# Patient Record
Sex: MALE | Race: BLACK OR AFRICAN AMERICAN | NOT HISPANIC OR LATINO | Employment: UNEMPLOYED | URBAN - METROPOLITAN AREA
[De-identification: names, ages, dates, MRNs, and addresses within clinical notes are randomized per-mention and may not be internally consistent; named-entity substitution may affect disease eponyms.]

---

## 2022-09-23 ENCOUNTER — HOSPITAL ENCOUNTER (EMERGENCY)
Facility: CLINIC | Age: 22
Discharge: PSYCHIATRIC OR SUBSTANCE USE FACILITY | End: 2022-09-24
Attending: EMERGENCY MEDICINE | Admitting: EMERGENCY MEDICINE
Payer: COMMERCIAL

## 2022-09-23 ENCOUNTER — TELEPHONE (OUTPATIENT)
Dept: BEHAVIORAL HEALTH | Facility: CLINIC | Age: 22
End: 2022-09-23

## 2022-09-23 VITALS
HEART RATE: 88 BPM | RESPIRATION RATE: 16 BRPM | SYSTOLIC BLOOD PRESSURE: 117 MMHG | TEMPERATURE: 98.9 F | OXYGEN SATURATION: 100 % | DIASTOLIC BLOOD PRESSURE: 85 MMHG

## 2022-09-23 DIAGNOSIS — R45.851 SUICIDAL IDEATION: ICD-10-CM

## 2022-09-23 LAB
AMPHETAMINES UR QL SCN: NORMAL
ANION GAP SERPL CALCULATED.3IONS-SCNC: 16 MMOL/L (ref 7–15)
BARBITURATES UR QL SCN: NORMAL
BASOPHILS # BLD AUTO: 0.1 10E3/UL (ref 0–0.2)
BASOPHILS NFR BLD AUTO: 1 %
BENZODIAZ UR QL SCN: NORMAL
BUN SERPL-MCNC: 8.1 MG/DL (ref 6–20)
BZE UR QL SCN: NORMAL
CALCIUM SERPL-MCNC: 9.6 MG/DL (ref 8.6–10)
CANNABINOIDS UR QL SCN: NORMAL
CHLORIDE SERPL-SCNC: 105 MMOL/L (ref 98–107)
CREAT SERPL-MCNC: 0.99 MG/DL (ref 0.67–1.17)
DEPRECATED HCO3 PLAS-SCNC: 22 MMOL/L (ref 22–29)
EOSINOPHIL # BLD AUTO: 0.2 10E3/UL (ref 0–0.7)
EOSINOPHIL NFR BLD AUTO: 2 %
ERYTHROCYTE [DISTWIDTH] IN BLOOD BY AUTOMATED COUNT: 12.7 % (ref 10–15)
ETHANOL SERPL-MCNC: 0.1 G/DL
GFR SERPL CREATININE-BSD FRML MDRD: >90 ML/MIN/1.73M2
GLUCOSE SERPL-MCNC: 94 MG/DL (ref 70–99)
HCT VFR BLD AUTO: 47.3 % (ref 40–53)
HGB BLD-MCNC: 15.8 G/DL (ref 13.3–17.7)
IMM GRANULOCYTES # BLD: 0 10E3/UL
IMM GRANULOCYTES NFR BLD: 0 %
LYMPHOCYTES # BLD AUTO: 2.3 10E3/UL (ref 0.8–5.3)
LYMPHOCYTES NFR BLD AUTO: 24 %
MCH RBC QN AUTO: 29.2 PG (ref 26.5–33)
MCHC RBC AUTO-ENTMCNC: 33.4 G/DL (ref 31.5–36.5)
MCV RBC AUTO: 87 FL (ref 78–100)
MONOCYTES # BLD AUTO: 0.7 10E3/UL (ref 0–1.3)
MONOCYTES NFR BLD AUTO: 7 %
NEUTROPHILS # BLD AUTO: 6.5 10E3/UL (ref 1.6–8.3)
NEUTROPHILS NFR BLD AUTO: 66 %
NRBC # BLD AUTO: 0 10E3/UL
NRBC BLD AUTO-RTO: 0 /100
OPIATES UR QL SCN: NORMAL
PLATELET # BLD AUTO: 289 10E3/UL (ref 150–450)
POTASSIUM SERPL-SCNC: 3.9 MMOL/L (ref 3.4–5.3)
RBC # BLD AUTO: 5.42 10E6/UL (ref 4.4–5.9)
SARS-COV-2 RNA RESP QL NAA+PROBE: NEGATIVE
SODIUM SERPL-SCNC: 143 MMOL/L (ref 136–145)
WBC # BLD AUTO: 9.7 10E3/UL (ref 4–11)

## 2022-09-23 PROCEDURE — 99285 EMERGENCY DEPT VISIT HI MDM: CPT | Mod: 25

## 2022-09-23 PROCEDURE — 90791 PSYCH DIAGNOSTIC EVALUATION: CPT

## 2022-09-23 PROCEDURE — 82310 ASSAY OF CALCIUM: CPT | Performed by: EMERGENCY MEDICINE

## 2022-09-23 PROCEDURE — 80307 DRUG TEST PRSMV CHEM ANLYZR: CPT | Performed by: EMERGENCY MEDICINE

## 2022-09-23 PROCEDURE — U0005 INFEC AGEN DETEC AMPLI PROBE: HCPCS | Performed by: EMERGENCY MEDICINE

## 2022-09-23 PROCEDURE — 85025 COMPLETE CBC W/AUTO DIFF WBC: CPT | Performed by: EMERGENCY MEDICINE

## 2022-09-23 PROCEDURE — 36415 COLL VENOUS BLD VENIPUNCTURE: CPT | Performed by: EMERGENCY MEDICINE

## 2022-09-23 PROCEDURE — C9803 HOPD COVID-19 SPEC COLLECT: HCPCS

## 2022-09-23 PROCEDURE — 82077 ASSAY SPEC XCP UR&BREATH IA: CPT | Performed by: EMERGENCY MEDICINE

## 2022-09-23 RX ORDER — LORAZEPAM 1 MG/1
1 TABLET ORAL EVERY 8 HOURS PRN
Status: DISCONTINUED | OUTPATIENT
Start: 2022-09-23 | End: 2022-09-24 | Stop reason: HOSPADM

## 2022-09-23 RX ORDER — IBUPROFEN 600 MG/1
600 TABLET, FILM COATED ORAL EVERY 6 HOURS PRN
Status: DISCONTINUED | OUTPATIENT
Start: 2022-09-23 | End: 2022-09-24 | Stop reason: HOSPADM

## 2022-09-23 RX ORDER — ACETAMINOPHEN 325 MG/1
650 TABLET ORAL EVERY 4 HOURS PRN
Status: DISCONTINUED | OUTPATIENT
Start: 2022-09-23 | End: 2022-09-24 | Stop reason: HOSPADM

## 2022-09-23 RX ORDER — OLANZAPINE 5 MG/1
10 TABLET, ORALLY DISINTEGRATING ORAL 2 TIMES DAILY PRN
Status: DISCONTINUED | OUTPATIENT
Start: 2022-09-23 | End: 2022-09-24 | Stop reason: HOSPADM

## 2022-09-23 ASSESSMENT — ACTIVITIES OF DAILY LIVING (ADL)
ADLS_ACUITY_SCORE: 35

## 2022-09-23 NOTE — ED NOTES
MD said ok to pull sitter. Pt calm and cooperative at this time. Meal tray ordered. Security watching on continuous video monitoring.
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
## 2022-09-23 NOTE — ED PROVIDER NOTES
Sign Out Note     Pt accepted in sign out from:  Dr. Hines    Briefly pt presented to the ED for:  Patient presented with suicidal ideation.  He left a note, and has access to firearms, therefore was deemed very high risk.  He is currently on a 72-hour hold.  He has been eating, and cooperative in the ED.     Plan at time of sign out:  Patient accepted for placement at [facility].  Transfer paperwork signed.     Care of patient during my shift: No issues, patient was cooperative and sleeping. Patient transferred successfully to [facility].        MD Nano Saldana, Julieta Dexter MD  09/24/22 0116

## 2022-09-23 NOTE — SAFE
Del Rueda  September 23, 2022  SAFE Note    Critical Safety Issues: still suicidal, regretting that he didn't shoot himself overnight      Current Suicidal Ideation/Self-Injurious Concerns/Methods: Other shooting self - also punches self at times. Denies plans to do either at this very moment.      Current or Historical Inappropriate Sexual Behavior: No      Current or Historical Aggression/Homicidal Ideation: None - N/A      Triggers: depression, family stuff, perfectionism, separation from  in spring    Guardianship Status: [facility] Guardian: is his own guardian.. Guardianship paperwork is not required.    This patient is a child/adolescent: No    This patient has additional special visitor precautions: No    Updated care team: Yes: ED MD    For additional details see full [facility] assessment.       PRINCE Lees

## 2022-09-23 NOTE — ED TRIAGE NOTES
Patient arrives via EMS with suicidal ideation and intent to end his life tonight. Patient had left a suicide note for him mom who then called PD. PD found patient walking to [facility] with multiple guns he planned to use to end his life. Upon arrival to ED, patient calm, cooperative and VSS.

## 2022-09-23 NOTE — ED NOTES
Patient phone placed in room so that pt can speak with his mother. Sitter remains at bedside for safety.

## 2022-09-23 NOTE — TELEPHONE ENCOUNTER
Potential bed avail at  [facility] in [place]   342pm - Intake left VM for April, on call provider, awaiting call back   411pm - April called back, will review if the [facility] doesn't [facility] to accommodate     Per chart review the pt is ex-   349pm - Intake placed call to [facility] ([phone]). 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     416pm - April, on call provider, accepts     R: SHANTEL/Lien     Pt placed in queue   422pm - unit charge notified, ready for report to [facility]   433pm - ED notified     5N # [phone]  [facility] ED # [phone]
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
## 2022-09-23 NOTE — ED NOTES
Pt given ABIMAEL paperwork, pt changed into behavioral scrubs, belongings secured in room 5 closet. Pt allowed to keep glasses and hat after both were inspected. All vital sign machine cords removed from room, room made as safe as possible.
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
## 2022-09-23 NOTE — ED NOTES
Report given to Pinky at [facility] by STAS Roldan. Awaiting EMS transportation.

## 2022-09-24 ENCOUNTER — HOSPITAL ENCOUNTER (INPATIENT)
Facility: HOSPITAL | Age: 22
LOS: 3 days | Discharge: HOME | End: 2022-09-27
Attending: STUDENT IN AN ORGANIZED HEALTH CARE EDUCATION/TRAINING PROGRAM | Admitting: STUDENT IN AN ORGANIZED HEALTH CARE EDUCATION/TRAINING PROGRAM
Payer: COMMERCIAL

## 2022-09-24 PROCEDURE — 124N000001 HC R&B MH

## 2022-09-24 PROCEDURE — 99223 1ST HOSP IP/OBS HIGH 75: CPT | Mod: 95 | Performed by: PSYCHIATRY & NEUROLOGY

## 2022-09-24 RX ORDER — LANOLIN ALCOHOL/MO/W.PET/CERES
3 CREAM (GRAM) TOPICAL
Status: DISCONTINUED | OUTPATIENT
Start: 2022-09-24 | End: 2022-09-27 | Stop reason: HOSPADM

## 2022-09-24 RX ORDER — OLANZAPINE 10 MG/2ML
10 INJECTION, POWDER, FOR SOLUTION INTRAMUSCULAR 3 TIMES DAILY PRN
Status: DISCONTINUED | OUTPATIENT
Start: 2022-09-24 | End: 2022-09-27 | Stop reason: HOSPADM

## 2022-09-24 RX ORDER — MAGNESIUM HYDROXIDE/ALUMINUM HYDROXICE/SIMETHICONE 120; 1200; 1200 MG/30ML; MG/30ML; MG/30ML
30 SUSPENSION ORAL EVERY 4 HOURS PRN
Status: DISCONTINUED | OUTPATIENT
Start: 2022-09-24 | End: 2022-09-27 | Stop reason: HOSPADM

## 2022-09-24 RX ORDER — OLANZAPINE 10 MG/1
10 TABLET ORAL 3 TIMES DAILY PRN
Status: DISCONTINUED | OUTPATIENT
Start: 2022-09-24 | End: 2022-09-27 | Stop reason: HOSPADM

## 2022-09-24 RX ORDER — HYDROXYZINE HYDROCHLORIDE 25 MG/1
25 TABLET, FILM COATED ORAL EVERY 4 HOURS PRN
Status: DISCONTINUED | OUTPATIENT
Start: 2022-09-24 | End: 2022-09-27 | Stop reason: HOSPADM

## 2022-09-24 RX ORDER — AMOXICILLIN 250 MG
1 CAPSULE ORAL 2 TIMES DAILY PRN
Status: DISCONTINUED | OUTPATIENT
Start: 2022-09-24 | End: 2022-09-27 | Stop reason: HOSPADM

## 2022-09-24 RX ORDER — ACETAMINOPHEN 325 MG/1
650 TABLET ORAL EVERY 4 HOURS PRN
Status: DISCONTINUED | OUTPATIENT
Start: 2022-09-24 | End: 2022-09-27 | Stop reason: HOSPADM

## 2022-09-24 ASSESSMENT — ACTIVITIES OF DAILY LIVING (ADL)
ORAL_HYGIENE: INDEPENDENT
DOING_ERRANDS_INDEPENDENTLY_DIFFICULTY: NO
CHANGE_IN_FUNCTIONAL_STATUS_SINCE_ONSET_OF_CURRENT_ILLNESS/INJURY: NO
DRESSING/BATHING_DIFFICULTY: NO
HYGIENE/GROOMING: INDEPENDENT
ADLS_ACUITY_SCORE: 30
WEAR_GLASSES_OR_BLIND: YES
ADLS_ACUITY_SCORE: 30
TOILETING_ISSUES: NO
ADLS_ACUITY_SCORE: 30
ADLS_ACUITY_SCORE: 30
ADLS_ACUITY_SCORE: 43
ADLS_ACUITY_SCORE: 30
CONCENTRATING,_REMEMBERING_OR_MAKING_DECISIONS_DIFFICULTY: NO
ADLS_ACUITY_SCORE: 30
DRESS: SCRUBS (BEHAVIORAL HEALTH)
HYGIENE/GROOMING: INDEPENDENT
ADLS_ACUITY_SCORE: 30
ADLS_ACUITY_SCORE: 30
LAUNDRY: UNABLE TO COMPLETE
DIFFICULTY_EATING/SWALLOWING: NO
WALKING_OR_CLIMBING_STAIRS_DIFFICULTY: NO
DRESS: SCRUBS (BEHAVIORAL HEALTH);INDEPENDENT
ADLS_ACUITY_SCORE: 30
ADLS_ACUITY_SCORE: 30
ORAL_HYGIENE: INDEPENDENT
FALL_HISTORY_WITHIN_LAST_SIX_MONTHS: NO
ADLS_ACUITY_SCORE: 30

## 2022-09-24 NOTE — PROGRESS NOTES
09/24/22 0131   Patient Belongings   Did you bring any home meds/supplements to the hospital?  No   Patient Belongings none;remains with patient;sent home;sent to security per site process;returned to patient at discharge;other (see comments)  (scrub top. scrub bottoms, underwwear, socks, hat, paperwork, t-shirt, jeans, shirt, shoes, jaket, headphones, picture)   Patient Belongings Remaining with Patient glasses  (glasses)   Patient Belongings Sent Home none   Patient Belongings Put in Hospital Secure Location (Security or Locker, etc.) none;cell phone/electronics  ([place] id, [email or web address]. cell phone with a hole in phone and case)   Belongings Search Yes   Clothing Search Yes   Second Staff Bridger   List items sent to safe: ABOVE    All other belongings put in assigned cubby in belongings room.   ABOVE    I have reviewed my belongings list on admission and verify that it is correct.     Patient signature_______________________________    Second staff witness (if patient unable to sign) ______________________________       I have received all my belongings at discharge.    Patient signature________________________________    Lorrie   9/24/2022  1:34 AM
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
## 2022-09-24 NOTE — PLAN OF CARE
Problem: Suicide Risk  Goal: Absence of Self-Harm  Description: Pt will be free from self harm while on the unit  Pt will recognize 2 coping skills while on the unit   Outcome: Ongoing, Progressing   Patient has been free from self harm.  He does admit to feeling depressed and anxious with feelings of wanting to die.  Patient does state he will be safe while on the unit.       Problem: Behavioral Health Plan of Care  Goal: Patient-Specific Goal (Individualization)  Description: Pt will sleep at least 6 hours per night.  Pt will eat at least 50% of meals.  Pt will take all medications as ordered.  Pt will comply with all treatment team recommendations.     Outcome: Ongoing, Progressing   Patient has been cam, cooperative, and medication complaint this shift.  He was out in the lounge much of the day.  Did not attend groups but is social with staff when approached.  Affect is flat and he admits to feeling down.  No complaints of pain.  VS WNL.  Face to face end of shift report communicated to evening shift RN.     Sobeida Augustin RN  9/24/2022  2:15 PM

## 2022-09-25 PROCEDURE — 124N000001 HC R&B MH

## 2022-09-25 PROCEDURE — 250N000013 HC RX MED GY IP 250 OP 250 PS 637: Performed by: NURSE PRACTITIONER

## 2022-09-25 PROCEDURE — 99233 SBSQ HOSP IP/OBS HIGH 50: CPT | Mod: 95 | Performed by: PSYCHIATRY & NEUROLOGY

## 2022-09-25 RX ADMIN — OLANZAPINE 10 MG: 10 TABLET, FILM COATED ORAL at 20:32

## 2022-09-25 RX ADMIN — MELATONIN 3 MG: 3 TAB ORAL at 20:32

## 2022-09-25 ASSESSMENT — ACTIVITIES OF DAILY LIVING (ADL)
ADLS_ACUITY_SCORE: 30
ORAL_HYGIENE: INDEPENDENT
ADLS_ACUITY_SCORE: 30
HYGIENE/GROOMING: INDEPENDENT
LAUNDRY: UNABLE TO COMPLETE
DRESS: SCRUBS (BEHAVIORAL HEALTH);INDEPENDENT
ADLS_ACUITY_SCORE: 30
ORAL_HYGIENE: INDEPENDENT
ADLS_ACUITY_SCORE: 30
HYGIENE/GROOMING: INDEPENDENT
DRESS: SCRUBS (BEHAVIORAL HEALTH)
ADLS_ACUITY_SCORE: 30
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
## 2022-09-25 NOTE — PROGRESS NOTES
Pt referred with malnutrition screen score of 3 - weight loss 24-33#.   21 yom admitted with suicidal behavior.    Current height/weight not noted.    Regular diet ordered.  Pt has consumed 100% three meals since being admitted.    Not enough information available to assess nutrition status.    Weight and height should be obtained.   RD will continue to assess as more information becomes available.

## 2022-09-25 NOTE — PLAN OF CARE
SHIFT NOTE: 2300 to 1530      Problem: Suicide Risk  Goal: Absence of Self-Harm  Description: Pt will be free from self harm while on the unit  Pt will recognize 2 coping skills while on the unit   Outcome: Ongoing, Progressing       Patient denies active suicidal thoughts at this time.  States he will be safe while on the unit.       Problem: Behavioral Health Plan of Care  Goal: Patient-Specific Goal (Individualization)  Description: Pt will sleep at least 6 hours per night.  Pt will eat at least 50% of meals.  Pt will take all medications as ordered.  Pt will comply with all treatment team recommendations.     Outcome: Ongoing, Progressing   Patient up one time during the night.  He was out in the lounge for about an hour then returned to bed.  Calm and cooperative with staff.  No scheduled or PRN medications given.  Attended groups briefly.  Affect is brighter and he reports feeling less depressed and more hopeful.  No complaints of pain.  VS WNL.  Face to face end of shift report communicated to evening shift RN .     Sobeida Augustin RN  9/25/2022  1:30 PM
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
## 2022-09-26 PROCEDURE — 99239 HOSP IP/OBS DSCHRG MGMT >30: CPT | Mod: 95 | Performed by: PSYCHIATRY & NEUROLOGY

## 2022-09-26 PROCEDURE — 124N000001 HC R&B MH

## 2022-09-26 ASSESSMENT — ACTIVITIES OF DAILY LIVING (ADL)
ADLS_ACUITY_SCORE: 30
DRESS: INDEPENDENT;SCRUBS (BEHAVIORAL HEALTH)
ADLS_ACUITY_SCORE: 30
ADLS_ACUITY_SCORE: 30
HYGIENE/GROOMING: INDEPENDENT
ADLS_ACUITY_SCORE: 30
DRESS: INDEPENDENT;SCRUBS (BEHAVIORAL HEALTH)
ADLS_ACUITY_SCORE: 30
ORAL_HYGIENE: INDEPENDENT
ORAL_HYGIENE: INDEPENDENT
ADLS_ACUITY_SCORE: 30
HYGIENE/GROOMING: INDEPENDENT
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
## 2022-09-26 NOTE — DISCHARGE INSTRUCTIONS
Behavioral Discharge Planning and Instructions    Summary: You were admitted on 9/24/2022  due to Suicidal Ideations.  You were treated by Dr. Alex Tang and discharged on 9/27/2022 from [facility] to Home    Main Diagnosis: Major Depressive Disorder, Recurrent, Severe    Health Care Follow-up:     [facility]  [address] [place] [address]  [phone]    [facility]  [address]  [place], [place]  [phone]    [facility] - Virtual therapy appointments  [phone]      Attend all scheduled appointments with your outpatient providers. Call at least 24 hours in advance if you need to reschedule an appointment to ensure continued access to your outpatient providers.     Major Treatments, Procedures and Findings:  You were provided with: a psychiatric assessment, assessed for medical stability, medication evaluation and/or management, group therapy, milieu management, and medical interventions    Symptoms to Report: feeling more aggressive, increased confusion, losing more sleep, mood getting worse, or thoughts of suicide    Early warning signs can include: increased depression or anxiety sleep disturbances increased thoughts or behaviors of suicide or self-harm  increased unusual thinking, such as paranoia or hearing voices    Safety and Wellness:  Take all medicines as directed.  Make no changes unless your doctor suggests them.      Follow treatment recommendations.  Refrain from alcohol and non-prescribed drugs.  If there is a concern for safety, call 911.    Resources:   Crisis Intervention: 246.506.8308 or 080-592-5482 (TTY: 798.468.2730).  Call anytime for help.  National [place] on Mental Illness (www.[place].tori.org): 171.410.8446 or 557-478-2957.  [place] Association for Children's Mental Health (www.macmh.org): 278.557.1317.  Alcoholics Anonymous (www.alcoholics-anonymous.org): Check your phone book for your local chapter.  Suicide Awareness Voices of Education  "(SAVE) (www.save.org): 244-105-DKMS (7283)  National Suicide Prevention Line (www.mentalhealthmn.org): 092-895-YUMC (5904)  Mental Health Association of [place] (www.mentalhealth.org): 382.794.2400 or 499-126-6607  [facility] Crisis Response 496-503-7667  [facility] Crisis (COPE) Response - Adult 214 805-7131  [facility] Crisis Response - Adult [phone]  [facility] Rapid Response 425-668-2626  Text 4 Life: txt \"LIFE\" to 75628 for immediate support and crisis intervention  Crisis text line: Text \"[place]\" to 407577. Free, confidential, 24/7.  Crisis Intervention: 129.627.5223 or 351-722-5111. Call anytime for help.   [facility] Mental [facility] Crisis Team - Child: [phone]  [facility] Childrens Mental Health Crisis Response Team - Child: [phone]    General Medication Instructions:   See your medication sheet(s) for instructions.   Take all medicines as directed.  Make no changes unless your doctor suggests them.   Go to all your doctor visits.  Be sure to have all your required lab tests. This way, your medicines can be refilled on time.  Do not use any drugs not prescribed by your doctor.  Avoid alcohol.    Advance Directives:   Scanned document on file with [facility]? No scanned doc  Is document scanned? Pt states no documents  Honoring Choices Your Rights Handout: Informed and given  Was more information offered? Pt declined    The Treatment team has appreciated the opportunity to work with you. If you have any questions or concerns about your recent admission, you can contact the unit which can receive your call 24 hours a day, 7 days a week. They will be able to get in touch with a Provider if needed. The unit number is [phone] .    [place] Area:  [facility], [facility] stabilization [facility]- [phone]  [facility] Crisis Line: 1-424.225.1172  [facility]: 714-5525  Sexual Assault Program [facility]: 980.686.9622 or 1-976.439.8668  [place] [facility]" "Battered Women's Program: 4-995-588-8123 Ext: 279       Calls answered Mon-Fri-8:00 am--4:30 pm    [place]:  [facility]: 0-732-762-8800  [facility] - 8-576-613-4662  [facility] first call for help: 8-981-038-6444  [facility]:  (860) 640-5314    [place] Area:  Warm Line: 1-112.771.2774       Calls answered Tuesday--Saturday 4:00 pm--10:00 pm  [facility] Crisis Line - [phone]  [facility] 184-499-9828    [place] Statewide:  [place] Crisis and Referral Services: 4-691-723-0989  National Suicide Prevention Lifeline: 2-756-616-TALK (5664)   - tsy3bpta- Text \"Life\" to 14693  First Call for Help: 2-1-1  [facility] Helpline- 6-760-PRHE-HELP   Crisis Text Line: Text  [place]  to 915026   "
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
## 2022-09-26 NOTE — PLAN OF CARE
Problem: Behavioral Health Plan of Care  Goal: Patient-Specific Goal (Individualization)  Description: Pt will sleep at least 6 hours per night.  Pt will eat at least 50% of meals.  Pt will take all medications as ordered.  Pt will comply with all treatment team recommendations.     Outcome: Ongoing, Progressing  Note:     1230 Patient resting throughout the morning. Up and spoke with MD. Patient states that since he had trouble sleeping but now that he has eye plugs he no longer has trouble and he is sleeping so well. Patient currently denies any thoughts of self harm. Patient states that he has learned a lot here and he feels this is a very good place. Feels safe and able to discuss his problems with other patients and in groups. Patient plans to get back to work and to work on his feelings toward family with a therapist. Patient has linear thought process and stable mood. Slept through breakfast but did have lunch.    Problem: Suicide Risk  Goal: Absence of Self-Harm  Description: Pt will be free from self harm while on the unit  Pt will recognize 2 coping skills while on the unit   Outcome: Ongoing, Not Progressing     Problem: Suicidal Behavior  Goal: Suicidal Behavior is Absent or Managed  Outcome: Ongoing, Not Progressing      Goal Outcome Evaluation:    Plan of Care Reviewed With: patient

## 2022-09-26 NOTE — PLAN OF CARE
Problem: Behavioral Health Plan of Care  Goal: Patient-Specific Goal (Individualization)  Description: Pt will sleep at least 6 hours per night.  Pt will eat at least 50% of meals.  Pt will take all medications as ordered.  Pt will comply with all treatment team recommendations.     Outcome: Ongoing, Progressing   Goal Outcome Evaluation:      Face to face shift report received from RN. Rounding completed, pt observed.Client rested in room for 7 hours with eyes closed and respirations noted.Face to face report will be communicated to oncoming RN.    Salvador Blanco RN  9/26/2022  6:14 AM

## 2022-09-27 VITALS
DIASTOLIC BLOOD PRESSURE: 53 MMHG | OXYGEN SATURATION: 98 % | SYSTOLIC BLOOD PRESSURE: 100 MMHG | RESPIRATION RATE: 16 BRPM | BODY MASS INDEX: 21.76 KG/M2 | WEIGHT: 164.9 LBS | TEMPERATURE: 98.1 F | HEART RATE: 71 BPM

## 2022-09-27 ASSESSMENT — ACTIVITIES OF DAILY LIVING (ADL)
ADLS_ACUITY_SCORE: 30

## 2022-09-27 NOTE — PROGRESS NOTES
SW called Nellie wright 4 time with no answer to find out about the patient's  time.       SW spoke to [facility] and they stated they will pick the patient up this morning @ 9:00 am.

## 2022-09-27 NOTE — PLAN OF CARE
Problem: Behavioral Health Plan of Care  Goal: Patient-Specific Goal (Individualization)  Description: Pt will sleep at least 6 hours per night.  Pt will eat at least 50% of meals.  Pt will take all medications as ordered.  Pt will comply with all treatment team recommendations.     Note: 07:35: Received end of shift report from STAS Franco. Pt out in lounge area, full range affect, mood is calm. [facility] [employer] to  time TBD.     Discharge Note    Patient Discharged to home on 9/27/2022 09:30 AM [facility] accompanied by ADWOA;[facility] .     Patient informed of discharge instructions in AVS. patient verbalizes understanding and denies having any questions pertaining to AVS. Patient stable at time of discharge. Patient denies SI, HI, and thoughts of self harm at time of discharge. All personal belongings returned to patient. No discharge Rx medications. Declines outpatient service set-up.     Sara Lim RN  9/27/2022  10:42 AM       Problem: Behavioral Health Plan of Care  Goal: Optimal Comfort and Wellbeing  Outcome: Met     Problem: Behavioral Health Plan of Care  Goal: Optimized Coping Skills in Response to Life Stressors  Outcome: Met     Problem: Behavioral Health Plan of Care  Goal: Develops/Participates in Therapeutic [place] to Support Successful Transition  Outcome: Met     Problem: Suicide Risk  Goal: Absence of Self-Harm  Description: Pt will be free from self harm while on the unit  Pt will recognize 2 coping skills while on the unit   Outcome: Met     Problem: Suicidal Behavior  Goal: Suicidal Behavior is Absent or Managed  Outcome: Met   Goal Outcome Evaluation:

## 2022-09-27 NOTE — PLAN OF CARE
Problem: Behavioral Health Plan of Care  Goal: Patient-Specific Goal (Individualization)  Description: Pt will sleep at least 6 hours per night.  Pt will eat at least 50% of meals.  Pt will take all medications as ordered.  Pt will comply with all treatment team recommendations.     Outcome: Ongoing, Progressing   Goal Outcome Evaluation:        Face to face shift report received from RN. Rounding completed, pt observed.Client rested in room for 3.5 hours with eyes closed and respirations noted. Face to face report will be communicated to oncoming RN.    Salvador Blanco RN  9/27/2022  6:35 AM

## 2022-09-27 NOTE — PROGRESS NOTES
Pt is discharging at the recommendation of the treatment team. Pt is discharging to home transported by [facility] [employer]. Pt denies having any thoughts of hurting themself or anyone else. Pt denies anxiety or depression. Pt was provided with out patient resources in is area, and the patient already has his own appointments scheduled. Discharge instructions, including; demographic sheet, psychiatric evaluation, discharge summary, and AVS were faxed to these next level of care providers.

## 2022-09-27 NOTE — PLAN OF CARE
Problem: Behavioral Health Plan of Care  Goal: Patient-Specific Goal (Individualization)  Description: Pt will sleep at least 6 hours per night.  Pt will eat at least 50% of meals.  Pt will take all medications as ordered.  Pt will comply with all treatment team recommendations.     9/26/2022 1908 by Yolie Pereira RN  Outcome: Ongoing, Progressing  Note:     1900 Patient is alert and up on the unit for dinner meal and ate well. Patient attending groups and ate in the lounge and states he continues to do well and looks forward to discharge tomorrow. As of this time there has been no call from [facility] regarding  time tomorrow.    2230 Patient is in bed with eyes closed and regular resps.    Face to face end of shift report communicated to 11-7 shift RN.     Yolie Pereira RN  9/26/2022  9:48 PM      11    Problem: Suicide Risk  Goal: Absence of Self-Harm  Description: Pt will be free from self harm while on the unit  Pt will recognize 2 coping skills while on the unit   9/26/2022 1908 by Yolie Pereira RN  Outcome: Ongoing, Progressing     Problem: Suicidal Behavior  Goal: Suicidal Behavior is Absent or Managed  9/26/2022 1908 by Yolie Pereira RN  Outcome: Ongoing, Progressing      Goal Outcome Evaluation:    Plan of Care Reviewed With: patient

## 2025-04-14 NOTE — PLAN OF CARE
"Problem: Behavioral Health Plan of Care  Goal: Patient-Specific Goal (Individualization)  Description: Pt will sleep at least 6 hours per night.  Pt will eat at least 50% of meals.  Pt will take all medications as ordered.  Pt will comply with all treatment team recommendations.   Outcome: Ongoing, Progressing  Note: Pt was observed in the lounge socializing with peers at the start of the shift. Pt had a bright affect and was observed frequently laughing. Pt endorsed ongoing anxiety and depression. He denied suicidal ideation and thoughts of self harm. Encouraged pt to work on his wellness recovery action plan. 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    2032 - Pt received 10 mg of PRN Zyprexa for agitation r/t inability to sleep along with 3 mg of PRN Melatonin.     Face to face end of shift report communicated to oncoming RN.      Problem: Suicide Risk  Goal: Absence of Self-Harm  Description: Pt will be free from self harm while on the unit  Pt will recognize 2 coping skills while on the unit   Outcome: Ongoing, Progressing  Note: Pt denied suicidal ideation. He remained free from self harm.      Goal Outcome Evaluation:    Plan of Care Reviewed With: patient                 " MTM referral from: Transitions of Care (recent hospital discharge, TCU discharge, or ED visit)    MTM referral outreach attempt #2 on April 14, 2025 at 10:37 AM      Outcome: Left Message    Use [facility] part d MAP for the carrier/Plan on the flowsheet      MTM Practitioner please send patient letter    Christelle He CMA  MTM